# Patient Record
Sex: MALE | Race: WHITE | NOT HISPANIC OR LATINO | Employment: PART TIME | URBAN - METROPOLITAN AREA
[De-identification: names, ages, dates, MRNs, and addresses within clinical notes are randomized per-mention and may not be internally consistent; named-entity substitution may affect disease eponyms.]

---

## 2024-04-12 ENCOUNTER — OFFICE VISIT (OUTPATIENT)
Dept: URGENT CARE | Facility: PHYSICIAN GROUP | Age: 24
End: 2024-04-12
Payer: COMMERCIAL

## 2024-04-12 VITALS
DIASTOLIC BLOOD PRESSURE: 82 MMHG | SYSTOLIC BLOOD PRESSURE: 126 MMHG | HEIGHT: 76 IN | RESPIRATION RATE: 20 BRPM | WEIGHT: 287 LBS | BODY MASS INDEX: 34.95 KG/M2 | HEART RATE: 104 BPM | OXYGEN SATURATION: 98 % | TEMPERATURE: 97 F

## 2024-04-12 DIAGNOSIS — Z21 ASYMPTOMATIC HIV INFECTION (HCC): ICD-10-CM

## 2024-04-12 DIAGNOSIS — R21 RASH AND NONSPECIFIC SKIN ERUPTION: ICD-10-CM

## 2024-04-12 PROCEDURE — 3074F SYST BP LT 130 MM HG: CPT | Performed by: PHYSICIAN ASSISTANT

## 2024-04-12 PROCEDURE — 3079F DIAST BP 80-89 MM HG: CPT | Performed by: PHYSICIAN ASSISTANT

## 2024-04-12 PROCEDURE — 99203 OFFICE O/P NEW LOW 30 MIN: CPT | Performed by: PHYSICIAN ASSISTANT

## 2024-04-12 RX ORDER — BICTEGRAVIR SODIUM, EMTRICITABINE, AND TENOFOVIR ALAFENAMIDE FUMARATE 50; 200; 25 MG/1; MG/1; MG/1
1 TABLET ORAL DAILY
COMMUNITY

## 2024-04-12 RX ORDER — METHYLPREDNISOLONE 4 MG/1
TABLET ORAL
Qty: 21 TABLET | Refills: 0 | Status: SHIPPED | OUTPATIENT
Start: 2024-04-12

## 2024-04-12 NOTE — PROGRESS NOTES
"Subjective:   Vincent Myers is a 23 y.o. male who presents for Rash (21 days )  This is a very pleasant 23-year-old male who presents with whole body rash x 2 to 3 weeks with palmar and plantar involvement without oropharyngeal involvement.  Nonpustular, nonlinear, nonvesicular.  Slightly pruritic.  Has felt occasionally febrile at times although never measured.  No viral URI symptoms.  Did start new deodorant otherwise no new detergents soaps lotions medications or outdoor exposures.  No exposure to ticks.  He is managed by an HIV specialist, is on Biktarvy, has been undetectable on lab testing.  [place].  No recent genital lesions or ulcerations.  Has had STI in the past.  No associated sore throat.  Has not tried any medications for relief.  Rash all over  Changed deodorant, rash started appearing on arms and chest  x 2-3 weeks  Non pustular  Slightly itchy  No oral involvement, tongue swelling  Occasionally feels slightly febrile  No significant viral uri symptoms  No GI symptoms  No new detergents, soaps, lotions  Is on bictarvy  Has not had annual blood work  Managed by HI\v specialist, dx 8/21, has been undetectable on lab testing              Review of Systems   Skin:  Positive for rash.       Medications:  Biktarvy Tabs    Allergies:             Patient has no known allergies.    Surgical History:       No past surgical history on file.    Past Social Hx:  Vincent Myers       Past Family Hx:   Vincent Myers family history is not on file.       Problem list, medications, and allergies reviewed by myself today in Epic.     Objective:     /82   Pulse (!) 104   Temp 36.1 °C (97 °F) (Temporal)   Resp 20   Ht 1.93 m (6' 4\")   Wt (!) 130 kg (287 lb)   SpO2 98%   BMI 34.93 kg/m²     Physical Exam    Assessment/Plan:     Diagnosis and Associated Orders:     1. Asymptomatic HIV infection (HCC)    2. Rash and nonspecific " skin eruption  - methylPREDNISolone (MEDROL DOSEPAK) 4 MG Tablet Therapy Pack; Follow schedule on package instructions.  Dispense: 21 Tablet; Refill: 0    Other orders  - bictegravir-emtricitab-TAF (BIKTARVY) -25 mg Tab tablet; Take 1 Tablet by mouth every day.        Comments/MDM:  Patient with asymptomatic HIV infection on Biktarvy with whole body rash x 2 to 3 weeks.  Discussed differential diagnosis for rash at length.  Does not appear bacterial.  Does not appear fungal.  Differential diagnosis does include syphilis although patient has not had any lesions or sores.  Discussed testing here.  He is returning to [place] on Sunday.  He will defer testing until return to [place].  Discussed possibility of irritation or contact dermatitis.  Advised changing out deodorant.  Trial of oral steroids although discussed risk of immunosuppression.  No indication for antibiotics today.  Risks of untreated syphilis discussed.  Do advise immediate follow-up for syphilis testing which she politely declines here in our clinic due to him only being here visiting for another day.  He will follow-up emergently with his primary care physician and an HIV specialist.  Switch to all hypoallergenic products.  I personally reviewed prior external notes and test results pertinent to today's visit. Supportive care, natural history, differential diagnoses, and indications for immediate follow-up discussed. Return to clinic or go to ED if symptoms worsen or persist.  Red flag symptoms discussed.  Patient/Parent/Guardian voices understanding. Follow-up with your primary care provider in 3-5 days.  All side effects of medication discussed including allergic response, GI upset, tendon injury, rash, sedation etc    Please note that this dictation was created using voice recognition software. I have made a reasonable attempt to correct obvious errors, but I expect that there are errors of grammar and possibly content that I did not  discover before finalizing the note.    This note was electronically signed by Vaishali Gann PA-C